# Patient Record
Sex: FEMALE | HISPANIC OR LATINO
[De-identification: names, ages, dates, MRNs, and addresses within clinical notes are randomized per-mention and may not be internally consistent; named-entity substitution may affect disease eponyms.]

---

## 2020-03-16 ENCOUNTER — APPOINTMENT (OUTPATIENT)
Dept: ANTEPARTUM | Facility: CLINIC | Age: 30
End: 2020-03-16
Payer: MEDICAID

## 2020-03-16 ENCOUNTER — ASOB RESULT (OUTPATIENT)
Age: 30
End: 2020-03-16

## 2020-03-16 PROBLEM — Z00.00 ENCOUNTER FOR PREVENTIVE HEALTH EXAMINATION: Status: ACTIVE | Noted: 2020-03-16

## 2020-03-16 PROCEDURE — 76817 TRANSVAGINAL US OBSTETRIC: CPT

## 2020-03-19 ENCOUNTER — APPOINTMENT (OUTPATIENT)
Dept: MATERNAL FETAL MEDICINE | Facility: CLINIC | Age: 30
End: 2020-03-19
Payer: MEDICAID

## 2020-03-19 PROCEDURE — 99203 OFFICE O/P NEW LOW 30 MIN: CPT | Mod: 25

## 2020-04-17 ENCOUNTER — APPOINTMENT (OUTPATIENT)
Dept: MATERNAL FETAL MEDICINE | Facility: CLINIC | Age: 30
End: 2020-04-17
Payer: MEDICAID

## 2020-04-17 ENCOUNTER — ASOB RESULT (OUTPATIENT)
Age: 30
End: 2020-04-17

## 2020-04-17 PROCEDURE — 99215 OFFICE O/P EST HI 40 MIN: CPT | Mod: TH,95

## 2020-04-20 ENCOUNTER — APPOINTMENT (OUTPATIENT)
Dept: ANTEPARTUM | Facility: CLINIC | Age: 30
End: 2020-04-20
Payer: MEDICAID

## 2020-04-20 ENCOUNTER — ASOB RESULT (OUTPATIENT)
Age: 30
End: 2020-04-20

## 2020-04-20 VITALS — TEMPERATURE: 98.1 F

## 2020-04-20 PROCEDURE — 36416 COLLJ CAPILLARY BLOOD SPEC: CPT

## 2020-04-20 PROCEDURE — 76813 OB US NUCHAL MEAS 1 GEST: CPT

## 2020-04-20 PROCEDURE — 76814 OB US NUCHAL MEAS ADD-ON: CPT

## 2020-04-24 LAB
1ST TRIMESTER DATA: NORMAL
ADDENDUM DOC: NORMAL
AFP PNL SERPL: NORMAL
AFP SERPL-ACNC: NORMAL
CLINICAL BIOCHEMIST REVIEW: NORMAL
CLINICAL BIOCHEMIST REVIEW: NORMAL
COMMENTS TWIN B: NORMAL
FREE BETA HCG 1ST TRIMESTER: NORMAL
Lab: NORMAL
NASAL BONE- TWIN B: PRESENT
NASAL BONE: PRESENT
NOTES NTD: NORMAL
NOTES TWIN B: NORMAL
NT-TWIN B: NORMAL
NT: NORMAL
PAPP-A SERPL-ACNC: NORMAL
RECOMMENDATIONS TWIN B: NORMAL
TRISOMY 18/13 TWIN B: NORMAL
TRISOMY 18/3: NORMAL

## 2020-06-10 ENCOUNTER — ASOB RESULT (OUTPATIENT)
Age: 30
End: 2020-06-10

## 2020-06-10 ENCOUNTER — APPOINTMENT (OUTPATIENT)
Dept: ANTEPARTUM | Facility: CLINIC | Age: 30
End: 2020-06-10
Payer: MEDICAID

## 2020-06-10 PROCEDURE — 76812 OB US DETAILED ADDL FETUS: CPT

## 2020-06-10 PROCEDURE — 76811 OB US DETAILED SNGL FETUS: CPT

## 2020-07-09 ENCOUNTER — APPOINTMENT (OUTPATIENT)
Dept: MATERNAL FETAL MEDICINE | Facility: CLINIC | Age: 30
End: 2020-07-09
Payer: MEDICAID

## 2020-07-09 PROCEDURE — 99215 OFFICE O/P EST HI 40 MIN: CPT | Mod: TH

## 2020-07-13 ENCOUNTER — APPOINTMENT (OUTPATIENT)
Dept: ANTEPARTUM | Facility: CLINIC | Age: 30
End: 2020-07-13
Payer: MEDICAID

## 2020-07-13 ENCOUNTER — ASOB RESULT (OUTPATIENT)
Age: 30
End: 2020-07-13

## 2020-07-13 PROCEDURE — 76816 OB US FOLLOW-UP PER FETUS: CPT

## 2020-08-10 ENCOUNTER — APPOINTMENT (OUTPATIENT)
Dept: ANTEPARTUM | Facility: CLINIC | Age: 30
End: 2020-08-10
Payer: MEDICAID

## 2020-08-10 ENCOUNTER — ASOB RESULT (OUTPATIENT)
Age: 30
End: 2020-08-10

## 2020-08-10 PROCEDURE — 76816 OB US FOLLOW-UP PER FETUS: CPT | Mod: 59

## 2020-08-20 ENCOUNTER — APPOINTMENT (OUTPATIENT)
Dept: MATERNAL FETAL MEDICINE | Facility: CLINIC | Age: 30
End: 2020-08-20
Payer: MEDICAID

## 2020-08-20 ENCOUNTER — APPOINTMENT (OUTPATIENT)
Dept: MATERNAL FETAL MEDICINE | Facility: CLINIC | Age: 30
End: 2020-08-20

## 2020-08-20 ENCOUNTER — ASOB RESULT (OUTPATIENT)
Age: 30
End: 2020-08-20

## 2020-08-20 VITALS — WEIGHT: 192 LBS | BODY MASS INDEX: 34.02 KG/M2 | HEIGHT: 63 IN

## 2020-08-20 DIAGNOSIS — Z78.9 OTHER SPECIFIED HEALTH STATUS: ICD-10-CM

## 2020-08-20 PROCEDURE — G0108 DIAB MANAGE TRN  PER INDIV: CPT | Mod: 95

## 2020-08-28 ENCOUNTER — ASOB RESULT (OUTPATIENT)
Age: 30
End: 2020-08-28

## 2020-08-28 ENCOUNTER — APPOINTMENT (OUTPATIENT)
Dept: MATERNAL FETAL MEDICINE | Facility: CLINIC | Age: 30
End: 2020-08-28
Payer: MEDICAID

## 2020-08-28 PROCEDURE — G0108 DIAB MANAGE TRN  PER INDIV: CPT | Mod: 95

## 2020-08-28 RX ORDER — METFORMIN HYDROCHLORIDE 500 MG/1
500 TABLET, COATED ORAL
Qty: 1 | Refills: 1 | Status: ACTIVE | COMMUNITY
Start: 2020-08-28 | End: 1900-01-01

## 2020-09-10 ENCOUNTER — APPOINTMENT (OUTPATIENT)
Dept: ANTEPARTUM | Facility: CLINIC | Age: 30
End: 2020-09-10
Payer: MEDICAID

## 2020-09-10 ENCOUNTER — APPOINTMENT (OUTPATIENT)
Dept: MATERNAL FETAL MEDICINE | Facility: CLINIC | Age: 30
End: 2020-09-10
Payer: MEDICAID

## 2020-09-10 ENCOUNTER — ASOB RESULT (OUTPATIENT)
Age: 30
End: 2020-09-10

## 2020-09-10 VITALS
OXYGEN SATURATION: 98 % | RESPIRATION RATE: 18 BRPM | WEIGHT: 195 LBS | BODY MASS INDEX: 34.55 KG/M2 | HEART RATE: 78 BPM | HEIGHT: 63 IN | DIASTOLIC BLOOD PRESSURE: 68 MMHG | SYSTOLIC BLOOD PRESSURE: 100 MMHG

## 2020-09-10 DIAGNOSIS — O99.283 ENDOCRINE, NUTRITIONAL AND METABOLIC DISEASES COMPLICATING PREGNANCY, THIRD TRIMESTER: ICD-10-CM

## 2020-09-10 DIAGNOSIS — Z86.39 PERSONAL HISTORY OF OTHER ENDOCRINE, NUTRITIONAL AND METABOLIC DISEASE: ICD-10-CM

## 2020-09-10 DIAGNOSIS — Z53.1 PROCEDURE AND TREATMENT NOT CARRIED OUT BECAUSE OF PATIENT'S DECISION FOR REASONS OF BELIEF AND GROUP PRESSURE: ICD-10-CM

## 2020-09-10 DIAGNOSIS — O24.415 GESTATIONAL DIABETES MELLITUS IN PREGNANCY, CONTROLLED BY ORAL HYPOGLYCEMIC DRUGS: ICD-10-CM

## 2020-09-10 DIAGNOSIS — O30.043 TWIN PREGNANCY, DICHORIONIC/DIAMNIOTIC, THIRD TRIMESTER: ICD-10-CM

## 2020-09-10 DIAGNOSIS — O24.410 GESTATIONAL DIABETES MELLITUS IN PREGNANCY, DIET CONTROLLED: ICD-10-CM

## 2020-09-10 DIAGNOSIS — Z3A.33 33 WEEKS GESTATION OF PREGNANCY: ICD-10-CM

## 2020-09-10 DIAGNOSIS — Z82.61 FAMILY HISTORY OF ARTHRITIS: ICD-10-CM

## 2020-09-10 DIAGNOSIS — Z83.49 FAMILY HISTORY OF OTHER ENDOCRINE, NUTRITIONAL AND METABOLIC DISEASES: ICD-10-CM

## 2020-09-10 DIAGNOSIS — O99.213 OBESITY COMPLICATING PREGNANCY, THIRD TRIMESTER: ICD-10-CM

## 2020-09-10 DIAGNOSIS — E03.9 ENDOCRINE, NUTRITIONAL AND METABOLIC DISEASES COMPLICATING PREGNANCY, THIRD TRIMESTER: ICD-10-CM

## 2020-09-10 PROCEDURE — 76820 UMBILICAL ARTERY ECHO: CPT

## 2020-09-10 PROCEDURE — 76820 UMBILICAL ARTERY ECHO: CPT | Mod: 59

## 2020-09-10 PROCEDURE — 99215 OFFICE O/P EST HI 40 MIN: CPT | Mod: TH

## 2020-09-10 PROCEDURE — 76821 MIDDLE CEREBRAL ARTERY ECHO: CPT

## 2020-09-10 PROCEDURE — 76816 OB US FOLLOW-UP PER FETUS: CPT | Mod: 59

## 2020-09-10 PROCEDURE — 76816 OB US FOLLOW-UP PER FETUS: CPT

## 2020-09-10 PROCEDURE — 76819 FETAL BIOPHYS PROFIL W/O NST: CPT | Mod: 59

## 2020-09-10 PROCEDURE — 93976 VASCULAR STUDY: CPT

## 2020-09-10 PROCEDURE — 76819 FETAL BIOPHYS PROFIL W/O NST: CPT

## 2020-09-10 RX ORDER — FOLIC ACID 1 MG/1
1 TABLET ORAL
Qty: 30 | Refills: 3 | Status: ACTIVE | COMMUNITY
Start: 2020-09-10

## 2020-09-10 RX ORDER — PNV NO.95/FERROUS FUM/FOLIC AC 28MG-0.8MG
27-0.8 TABLET ORAL DAILY
Refills: 0 | Status: ACTIVE | COMMUNITY
Start: 2020-09-10

## 2020-09-10 NOTE — DISCUSSION/SUMMARY
[FreeTextEntry1] : She is 33 weeks and 3 days gestation by early ultrasound dating. \par \par She is overweight and obesity has been associated with a number of maternal complications such as gestational diabetes, pre-eclampsia, thrombophlebitis, labor abnormalities, post-term pregnancies,  delivery, and operative complications. Obesity has been associated with adverse fetal outcomes such as late stillbirth and  deliveries.  Obese women also have a two to three-fold increased incidence in congenital anomalies. There were no major fetal malformations seen during the fetal anatomy ultrasound examination. She has developed gestational diabetes.\par \par Regarding her gestational diabetes, she has been taking metformin 500 mg at bedtime since 2020. She states that she has been following the recommended diabetic diet. She performs fasting and 1 hour postprandial self glucose monitoring.  My review of her glucose log book from 20 to 9/10/20 revealed 7/10 elevated fasting glucose values. Her last 2 fasting glucose readings were WNL. All postprandial glucose readings were WNL. Her glycemic control appears to be improving.  I informed her that maintaining euglycemia is the most important factor associated with good  outcomes in pregnancies complicated by gestational diabetes. I told her that poor glucose control may cause fetal macrosomia, shoulder dystocia,  delivery,  respiratory distress syndrome and  metabolic complications such as hypoglycemia and hyperbilirubinemia.  I told her to eat three daily meals with 3 snacks to reduce postprandial glucose fluctuations. I told her that she is at risk for developing gestational hypertension or preeclampsia during the current pregnancy. I also told her that she is at risk for developing type 2 diabetes, metabolic syndrome and cardiovascular disease later in life.  I also recommend a 75 gram 2 hour OGTT approximately 6 - 8 weeks postpartum to determine whether she has impaired glucose tolerance or preexisting diabetes not diagnosed prior to the pregnancy. I believe that she needs more nutritional counseling. I made arrangements for her to see our diabetes educator as soon as possible. I ordered a hemoglobin A1c level. I told her that if her fasting glucose levels become elevated in subsequent days I recommend increasing the bedtime metformin dosage to 850 mg.\par \par Regarding the twin pregnancy, she wants to know the timing of delivery. I reminded her that she is at risk for having  labor.  I gave her  labor precautions. She was instructed on the signs and symptoms of labor.  I told her that dichorionic/diamniotic pregnancies that are uncomplicated should delivered by 38 0/7 to 38 6/7 weeks of gestation (ACOG Committee Opinion No. 764, 2019). She was advised to reduce her activities, have frequent rest periods and no sexual intercourse. I told her that I recommend weekly fetal surveillance using NST/BPP until delivery. She was advised to continue taking a daily baby aspirin to reduce her risk for having preeclampsia. \par \par Regarding her hypothyroidism, she takes 50 mcg of levothyroxine daily. She denies feeling tired or fatigue, having constipation, recent weight gain, feeling cold, and having dry skin. Thyroid function studies were not available for my review. She was advised to have serial thyroid function studies during pregnancy to document that she is euthyroid or adjust her medication. I ordered a free T4, free T3, and TSH level.\par \par \par

## 2020-09-10 NOTE — VITALS
[LMP (date): ___] : LMP was on [unfilled] [GA =___ Weeks] : which calculates to a GA of [unfilled] weeks [GA= ___ Days] : and [unfilled] day(s) [GOMEZ by LMP (date): ___] : The calculated GOMEZ by LMP is [unfilled] [By LMP] : this is the final GOMEZ

## 2020-09-10 NOTE — PAST MEDICAL HISTORY
[HIV Infection] : no HIV [Exposure To Gonorrhea] : no gonorrhea [Chlamydial Infections] : no chlamydia [Syphilis] : no syphilis [Hepatitis, B Virus] : no Hepatitis B [Herpes Simplex] : no genital herpes [Human Papilloma Virus Infection] : no genital warts [Hepatitis, C Virus] : no Hepatitis C [Trichomoniasis] : no trichomoniasis

## 2020-09-10 NOTE — SURGICAL HISTORY
[Fibroids] : no fibroids [Breast Disease] : no breast disease [Abn Paps] : no abnormal pap smears [STI's] : no STI's [Infertility] : no infertility [Cysts] : no cysts [OC Use] : no OC use

## 2020-09-10 NOTE — ACTIVE PROBLEMS
[Hypertension] : no hypertension [Autoimmune Disease] : no autoimmune disease [Heart Disease] : no heart disease [Renal Disease] : no kidney disease, no UTI [Neurologic Disorder] : no neurologic disorder, no epilepsy [Psychiatric Disorders] : no psychiatric disorders [Thrombophlebitis] : no varicosities, no phlebitis [Depression] : no depression, no post partum depression [Hepatic Disorder] : no hepatitis, no liver disease [Trauma] : no trauma/violence [Blood Transfusion (___ Ml)] : no history of blood transfusion

## 2020-09-10 NOTE — OB HISTORY
[LMP: ___] : LMP: [unfilled] [GOMEZ: ___] : GOMEZ: [unfilled] [EGA: ___ wks] : EGA: [unfilled] wks [Spontaneous] : Spontaneous conception [Normal Amount/Duration] : was of a normal amount and duration [Definite:  ___ (Date)] : the last menstrual period was [unfilled] [Menstrual Cramps] : menstrual cramps [Regular Cycle Intervals] : periods have been regular [FreeTextEntry1] : She had a maternal fetal medicine consultation with me on March 19, 2020 because she has a twin pregnancy and hypothyroidism. She had a followup MFM visit with me on July 9, 2020 because she refuses blood products since she is a Faith.\par \par She had genetic counseling on April 17, 2020. She declined prenatal diagnostic testing.\par \par She was seen by our diabetes educator on August 20, 2024 initial diabetes education and counseling. She had a followup visit with our diabetes educator on August 28, 2020. At that time she was started on metformin 500 mg at bedtime to reduce her fasting glucose levels.\par \par \par \par  [Spotting Between  Menses] : no spotting between menses

## 2020-09-10 NOTE — FAMILY HISTORY
[Age 35+ During Pregnancy] : not 35 or over during pregnancy [Reported Family History Of Birth Defects] : no congenital heart defects [Prasad-Sachs Carrier] : no Prasad-Sachs [Family History] : no mental retardation/autism [Reported Family History Of Genetic Disease] : no history of child defect in child of baby father

## 2020-09-15 RX ORDER — LEVOTHYROXINE SODIUM 0.05 MG/1
50 TABLET ORAL DAILY
Refills: 0 | Status: DISCONTINUED | COMMUNITY
Start: 2020-09-10 | End: 2020-09-15

## 2020-09-16 RX ORDER — LEVOTHYROXINE SODIUM 0.07 MG/1
75 TABLET ORAL DAILY
Qty: 30 | Refills: 1 | Status: ACTIVE | COMMUNITY
Start: 2020-09-15 | End: 1900-01-01

## 2020-09-25 ENCOUNTER — APPOINTMENT (OUTPATIENT)
Dept: MATERNAL FETAL MEDICINE | Facility: CLINIC | Age: 30
End: 2020-09-25
Payer: MEDICAID

## 2020-09-25 ENCOUNTER — ASOB RESULT (OUTPATIENT)
Age: 30
End: 2020-09-25

## 2020-09-25 PROCEDURE — G0108 DIAB MANAGE TRN  PER INDIV: CPT | Mod: 95

## 2020-10-07 ENCOUNTER — APPOINTMENT (OUTPATIENT)
Dept: ANTEPARTUM | Facility: CLINIC | Age: 30
End: 2020-10-07

## 2020-10-07 ENCOUNTER — APPOINTMENT (OUTPATIENT)
Dept: MATERNAL FETAL MEDICINE | Facility: CLINIC | Age: 30
End: 2020-10-07

## 2021-10-06 PROBLEM — Z53.1 REFUSAL OF BLOOD TRANSFUSIONS AS PATIENT IS JEHOVAH'S WITNESS: Status: ACTIVE | Noted: 2020-09-10

## 2023-09-18 ENCOUNTER — APPOINTMENT (OUTPATIENT)
Dept: OPHTHALMOLOGY | Facility: CLINIC | Age: 33
End: 2023-09-18
Payer: MEDICAID

## 2023-09-18 ENCOUNTER — NON-APPOINTMENT (OUTPATIENT)
Age: 33
End: 2023-09-18

## 2023-09-18 PROCEDURE — 92004 COMPRE OPH EXAM NEW PT 1/>: CPT

## 2023-09-26 NOTE — CHIEF COMPLAINT
[G ___] : G [unfilled] [P ___] : P [unfilled] [de-identified] : gestational diabetes on Metformin medication never

## 2023-11-29 ENCOUNTER — APPOINTMENT (OUTPATIENT)
Dept: RHEUMATOLOGY | Facility: CLINIC | Age: 33
End: 2023-11-29

## 2023-12-13 ENCOUNTER — APPOINTMENT (OUTPATIENT)
Dept: RHEUMATOLOGY | Facility: CLINIC | Age: 33
End: 2023-12-13